# Patient Record
Sex: FEMALE | Race: WHITE | URBAN - METROPOLITAN AREA
[De-identification: names, ages, dates, MRNs, and addresses within clinical notes are randomized per-mention and may not be internally consistent; named-entity substitution may affect disease eponyms.]

---

## 2022-09-26 ENCOUNTER — OFFICE VISIT (OUTPATIENT)
Dept: URBAN - METROPOLITAN AREA CLINIC 75 | Facility: CLINIC | Age: 72
End: 2022-09-26
Payer: COMMERCIAL

## 2022-09-26 DIAGNOSIS — H04.123 DRY EYE SYNDROME OF BILATERAL LACRIMAL GLANDS: ICD-10-CM

## 2022-09-26 DIAGNOSIS — H02.889 MEIBOMIAN GLAND DYSFUNCTION OF EYE: ICD-10-CM

## 2022-09-26 DIAGNOSIS — H25.13 AGE-RELATED NUCLEAR CATARACT, BILATERAL: ICD-10-CM

## 2022-09-26 DIAGNOSIS — H35.3131 NONEXUDATIVE AGE-RELATED MACULAR DEGENERATION, BILATERAL, EARLY DRY STAGE: ICD-10-CM

## 2022-09-26 DIAGNOSIS — H52.4 PRESBYOPIA: Primary | ICD-10-CM

## 2022-09-26 PROCEDURE — 92004 COMPRE OPH EXAM NEW PT 1/>: CPT

## 2022-09-26 ASSESSMENT — INTRAOCULAR PRESSURE
OS: 10
OD: 13

## 2022-09-26 ASSESSMENT — VISUAL ACUITY
OD: 20/20
OS: 20/20

## 2022-09-26 ASSESSMENT — KERATOMETRY
OD: 43.25
OS: 44.00

## 2022-09-26 NOTE — IMPRESSION/PLAN
Impression: Nonexudative age-related macular degeneration, bilateral, early dry stage: H35.3131. Plan: Discussed DX and possible outcomes with pt in detail Explained to pt that there is no treatment for Dry AMD
Discussed Vitamin/AREDS with pt, do not recommend them at this time due to early stage. Recommend sunglasses outside, no smoking and a good healthy diet. Will monitor yearly with DFE and OCT Pt voices understanding.

## 2022-09-26 NOTE — IMPRESSION/PLAN
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 Plan: Discussed diagnosis in detail with patient, along with treatment options. Patient instructed to apply warm compresses at least QD for minimum 10 minutes. Lid scrubs and hygiene were explained. Patient instructed to use artificial tears as needed for comfort. Will continue to observe condition and or symptoms.

## 2022-09-26 NOTE — IMPRESSION/PLAN
Impression: Presbyopia: H52.4. Plan: Dispensed new glasses Rx today. Patient to call if any issues with new glasses occur.

## 2022-10-24 ENCOUNTER — OFFICE VISIT (OUTPATIENT)
Dept: URBAN - METROPOLITAN AREA CLINIC 75 | Facility: CLINIC | Age: 72
End: 2022-10-24
Payer: MEDICARE

## 2022-10-24 DIAGNOSIS — H35.342 MACULAR PSEUDOHOLE OF LEFT EYE: ICD-10-CM

## 2022-10-24 DIAGNOSIS — H04.123 DRY EYE SYNDROME OF BILATERAL LACRIMAL GLANDS: ICD-10-CM

## 2022-10-24 DIAGNOSIS — H35.3131 NONEXUDATIVE AGE-RELATED MACULAR DEGENERATION, BILATERAL, EARLY DRY STAGE: Primary | ICD-10-CM

## 2022-10-24 DIAGNOSIS — H25.813 COMBINED FORMS OF AGE-RELATED CATARACT, BILATERAL: ICD-10-CM

## 2022-10-24 DIAGNOSIS — H02.889 MEIBOMIAN GLAND DYSFUNCTION OF EYE: ICD-10-CM

## 2022-10-24 DIAGNOSIS — H35.372 PUCKERING OF MACULA, LEFT EYE: ICD-10-CM

## 2022-10-24 DIAGNOSIS — H43.813 VITREOUS DEGENERATION, BILATERAL: ICD-10-CM

## 2022-10-24 PROCEDURE — 92134 CPTRZ OPH DX IMG PST SGM RTA: CPT

## 2022-10-24 PROCEDURE — 99214 OFFICE O/P EST MOD 30 MIN: CPT

## 2022-10-24 ASSESSMENT — INTRAOCULAR PRESSURE
OD: 16
OS: 16

## 2022-10-24 NOTE — IMPRESSION/PLAN
Impression: Meibomian gland dysfunction of eye: H02.889 Bilateral. Plan: Discussed diagnosis in detail with patient, along with treatment options. Patient instructed to apply warm compresses at least QD for minimum 10 minutes. Lid scrubs and hygiene were explained. Patient instructed to use artificial tears as needed for comfort. Will continue to observe condition and or symptoms.

## 2022-10-24 NOTE — IMPRESSION/PLAN
Impression: Vitreous degeneration, bilateral: H43.813. OCT ordered and done today Plan: Posterior vitreous detachment accounts for the patient's complaints. There is no evidence of retinal pathology. All signs and risks of retinal detachment and tears were discussed in detail. Patient instructed to call the office immediately if any symptoms noted. Recommend the patient return to office for follow up.

## 2022-10-24 NOTE — IMPRESSION/PLAN
Impression: Puckering of macula, left eye: H35.372. OCT ordered and done today Plan: Discussed diagnosis in detail with patient. Discussed treatment options with patient, though treatment is not required or recommended at this time. Will continue to observe condition and or symptoms. Reassured patient of current condition and treatment.

## 2022-10-24 NOTE — IMPRESSION/PLAN
Impression: Combined forms of age-related cataract, bilateral: H25.813. OCT ordered and done today Plan: Cataracts account for the patient's complaints. No treatment currently recommended. The patient will monitor vision changes and contact us with any decrease in vision.

## 2022-10-24 NOTE — IMPRESSION/PLAN
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 OCT ordered and done today Plan: Discussed options with pt. Will observe. PT given an option to be referred to Retinal Specialist or to continue monitoring.

## 2022-10-24 NOTE — IMPRESSION/PLAN
Impression: Nonexudative age-related macular degeneration, bilateral, early dry stage: H35.3131. OCT ordered and done today Plan: Macular degeneration, dry type with stable vision. Will continue to monitor vision and the patient has been instructed to call with any vision changes.

## 2023-04-25 ENCOUNTER — OFFICE VISIT (OUTPATIENT)
Dept: URBAN - METROPOLITAN AREA CLINIC 71 | Facility: CLINIC | Age: 73
End: 2023-04-25
Payer: MEDICARE

## 2023-04-25 DIAGNOSIS — H35.372 PUCKERING OF MACULA, LEFT EYE: ICD-10-CM

## 2023-04-25 DIAGNOSIS — H35.3131 NONEXUDATIVE AGE-RELATED MACULAR DEGENERATION, BILATERAL, EARLY DRY STAGE: Primary | ICD-10-CM

## 2023-04-25 DIAGNOSIS — H04.123 DRY EYE SYNDROME OF BILATERAL LACRIMAL GLANDS: ICD-10-CM

## 2023-04-25 DIAGNOSIS — H35.342 MACULAR CYST, HOLE, OR PSEUDOHOLE, LEFT EYE: ICD-10-CM

## 2023-04-25 DIAGNOSIS — H52.4 PRESBYOPIA: ICD-10-CM

## 2023-04-25 DIAGNOSIS — H25.813 COMBINED FORMS OF AGE-RELATED CATARACT, BILATERAL: ICD-10-CM

## 2023-04-25 PROCEDURE — 92134 CPTRZ OPH DX IMG PST SGM RTA: CPT

## 2023-04-25 PROCEDURE — 99214 OFFICE O/P EST MOD 30 MIN: CPT

## 2023-04-25 ASSESSMENT — INTRAOCULAR PRESSURE
OS: 14
OD: 12

## 2023-04-25 ASSESSMENT — VISUAL ACUITY
OD: 20/20
OS: 20/25

## 2023-04-25 NOTE — IMPRESSION/PLAN
Impression: Macular cyst, hole, or pseudohole, left eye: H35.342. OCT confirms macular cyst OS, stable compared to 10/2022. Plan: Discussed with pt. Continue to observe at this time.

## 2023-04-25 NOTE — IMPRESSION/PLAN
Impression: Combined forms of age-related cataract, bilateral: H25.813. Plan: Cataracts account for some of the patient's complaints. Still no treatment currently recommended. The patient will monitor vision changes and contact us with any decrease in vision. Will monitor yearly, or sooner if pt notices a change in vision, then can call to come in sooner for cataract evaluation.

## 2023-04-25 NOTE — IMPRESSION/PLAN
Impression: Nonexudative age-related macular degeneration, bilateral, early dry stage: H35.3131. OCT performed and reviewed today 04/25/23: stable. No SRF/IRF OU. Plan: Discussed in detail with pt. Reviewed with pt that there is no treatment for Dry AMD, only a supplement that may help slow the progression of it. Educated pt on AREDS. Due to stage of AMD recommend pt to continue with supplement Recommend DFE and OCT every 6 months

## 2023-04-25 NOTE — IMPRESSION/PLAN
Impression: Puckering of macula, left eye: H35.372. OCT today shows ERM is stable OS. Plan: Discussed with patient. Still no treatment recommended at this time. Will continue to observe condition and or symptoms. Reassured patient of current condition and treatment.